# Patient Record
Sex: FEMALE | Race: WHITE | Employment: OTHER | URBAN - METROPOLITAN AREA
[De-identification: names, ages, dates, MRNs, and addresses within clinical notes are randomized per-mention and may not be internally consistent; named-entity substitution may affect disease eponyms.]

---

## 2019-06-20 ENCOUNTER — OFFICE VISIT (OUTPATIENT)
Dept: ENDOCRINOLOGY | Age: 79
End: 2019-06-20
Payer: MEDICARE

## 2019-06-20 VITALS
HEIGHT: 65 IN | BODY MASS INDEX: 27.59 KG/M2 | RESPIRATION RATE: 16 BRPM | WEIGHT: 165.6 LBS | SYSTOLIC BLOOD PRESSURE: 128 MMHG | DIASTOLIC BLOOD PRESSURE: 76 MMHG | OXYGEN SATURATION: 96 % | HEART RATE: 60 BPM

## 2019-06-20 DIAGNOSIS — E03.9 HYPOTHYROIDISM, UNSPECIFIED TYPE: Primary | ICD-10-CM

## 2019-06-20 DIAGNOSIS — M81.0 OSTEOPOROSIS, UNSPECIFIED OSTEOPOROSIS TYPE, UNSPECIFIED PATHOLOGICAL FRACTURE PRESENCE: ICD-10-CM

## 2019-06-20 DIAGNOSIS — R73.03 PREDIABETES: ICD-10-CM

## 2019-06-20 DIAGNOSIS — E55.9 VITAMIN D DEFICIENCY: ICD-10-CM

## 2019-06-20 PROCEDURE — 1036F TOBACCO NON-USER: CPT | Performed by: INTERNAL MEDICINE

## 2019-06-20 PROCEDURE — 1123F ACP DISCUSS/DSCN MKR DOCD: CPT | Performed by: INTERNAL MEDICINE

## 2019-06-20 PROCEDURE — G8400 PT W/DXA NO RESULTS DOC: HCPCS | Performed by: INTERNAL MEDICINE

## 2019-06-20 PROCEDURE — G8419 CALC BMI OUT NRM PARAM NOF/U: HCPCS | Performed by: INTERNAL MEDICINE

## 2019-06-20 PROCEDURE — G8427 DOCREV CUR MEDS BY ELIG CLIN: HCPCS | Performed by: INTERNAL MEDICINE

## 2019-06-20 PROCEDURE — 1090F PRES/ABSN URINE INCON ASSESS: CPT | Performed by: INTERNAL MEDICINE

## 2019-06-20 PROCEDURE — 99214 OFFICE O/P EST MOD 30 MIN: CPT | Performed by: INTERNAL MEDICINE

## 2019-06-20 PROCEDURE — 4040F PNEUMOC VAC/ADMIN/RCVD: CPT | Performed by: INTERNAL MEDICINE

## 2019-06-20 RX ORDER — GLUCOSAMINE HCL/CHONDROITIN SU 500-400 MG
CAPSULE ORAL
Qty: 250 STRIP | Refills: 5 | Status: SHIPPED | OUTPATIENT
Start: 2019-06-20

## 2019-06-20 RX ORDER — CHOLESTYRAMINE 4 G/9G
1 POWDER, FOR SUSPENSION ORAL 2 TIMES DAILY
COMMUNITY

## 2019-06-20 RX ORDER — LANCETS
EACH MISCELLANEOUS
Qty: 250 EACH | Refills: 5 | Status: SHIPPED | OUTPATIENT
Start: 2019-06-20

## 2019-06-20 RX ORDER — FUROSEMIDE 20 MG/1
20 TABLET ORAL
COMMUNITY

## 2019-06-20 RX ORDER — MULTIVIT-MIN/IRON/FOLIC ACID/K 18-600-40
CAPSULE ORAL DAILY
COMMUNITY

## 2019-06-20 RX ORDER — PRAVASTATIN SODIUM 20 MG
20 TABLET ORAL DAILY
COMMUNITY

## 2019-06-20 RX ORDER — POTASSIUM CHLORIDE 1.5 G/1.77G
20 POWDER, FOR SOLUTION ORAL
COMMUNITY

## 2019-06-20 RX ORDER — LEVOTHYROXINE SODIUM 75 MCG
75 TABLET ORAL DAILY
COMMUNITY
End: 2019-06-20 | Stop reason: SDUPTHER

## 2019-06-20 RX ORDER — LEVOTHYROXINE SODIUM 75 MCG
75 TABLET ORAL DAILY
Qty: 90 TABLET | Refills: 5 | Status: SHIPPED | OUTPATIENT
Start: 2019-06-20 | End: 2019-08-09 | Stop reason: SDUPTHER

## 2019-06-20 RX ORDER — WARFARIN SODIUM 5 MG/1
5 TABLET ORAL DAILY
COMMUNITY

## 2019-06-20 NOTE — PROGRESS NOTES
[address] [place] [address] Department of Endocrinology Diabetes and Metabolism   [address] [facility] [address]   Phone: [phone]  Fax: [phone]    Date of Service: 6/20/2019    Medical Records Reviewed:   I personally reviewed and summarized previous records     Primary Care Physician: Jerry Wong MD  Referring physician: Noam Rosales MD  Provider: Lesly Thibodeaux MD        Reason for the visit:  Osteoporosis, Primary Hypothyroidism, vitD deficiency, prediabetes     History of Present Illness: The history is provided by the patient. No  was used. Accuracy of the patient data is excellent. Ms Ronn Arizmendi is a very pleasant 77 y.o. female seen today for follow up visit of following issues     Hypothyroidism   was diagnosed with hypothyroidism many years ago and currently on name brand synthroid 75 mcg daily. Patient takes synthroid in the morning at empty stomach , wait one hour before eating , avoid multivitamins containing calcium or iron with it.  6/18/2019 - TSH 0.378, FT4 1.35     Denies any tremors palpitations heat or cold intolerance diarrhea or constipation change in weight or appetite    MNG  Thyroid US 11/2017:  Rt lobe measures 3.5 cm --> there 6 mm complex nodule   Lt lobe measures 3 cm --> 4 mm nodule mainly cystic  Isthmus measures 2 mm --> no nodules   Nodules smaller than 2014 and 2013     Sherrel Signs denies any new lumps, bumps in her neck, change in her voice, or shortness of breath. No family history of thyroid cancer. No prior history of radiation to head or neck region.     Osteoporosis   she was diagnosed with osteoporosis many years ago  was on fosamax in the past, used for about 5 years doesn't remember what yrs  Thinks Had one dose of reclast, didn't tolerate well  Was on drug holiday few years ago   Currently on Boniva 150 mg/month since 2016     DXA scan 11/2016 at [facility]  Femoral neck T score - 2.2, Total Hip T score - 1.5, abused: Not on file     Physically abused: Not on file     Forced sexual activity: Not on file   Other Topics Concern    Not on file   Social History Narrative    Not on file     FAMILY HISTORY   Family History   Problem Relation Age of Onset    Hypertension Father     Heart Disease Father     Colon Cancer Father     Prostate Cancer Father     Heart Disease Mother     Hypertension Mother     Atrial Fibrillation Brother      ALLERGIES AND DRUG REACTIONS   Allergies   Allergen Reactions    Diflucan [Fluconazole]     Sulfa Antibiotics        CURRENT MEDICATIONS   Current Outpatient Medications   Medication Sig Dispense Refill    lansoprazole (PREVACID SOLUTAB) 30 MG disintegrating tablet Take 30 mg by mouth as needed      warfarin (COUMADIN) 5 MG tablet Take 5 mg by mouth daily On tues take 7.5 mg      pravastatin (PRAVACHOL) 20 MG tablet Take 20 mg by mouth daily      Cholecalciferol (VITAMIN D) 2000 units CAPS capsule Take by mouth daily      cholestyramine (QUESTRAN) 4 g packet Take 1 packet by mouth 2 times daily 1/2 cap bid      Calcium Carb-Cholecalciferol (CALCIUM + VITAMIN D3 PO) Take 600 mg by mouth 2 times daily      furosemide (LASIX) 20 MG tablet Take 20 mg by mouth Twice a Week      potassium chloride (KLOR-CON) 20 MEQ packet Take 20 mEq by mouth Twice a Week      ONE TOUCH ULTRASOFT LANCETS MISC Test 4 times/day before meals and at bedtime and as needed for symptoms of irregular blood glucose. 250 each 5    blood glucose monitor strips One-Touch Ultra strips. Check 4 times/day before meals and at bedtime and as needed for symptoms of irregular blood glucose 250 strip 5    SYNTHROID 75 MCG tablet Take 1 tablet by mouth Daily 90 tablet 5     No current facility-administered medications for this visit. Review of Systems  Constitutional: No fever, no chills, no diaphoresis, no generalized weakness.   HEENT: No blurred vision, No sore throat, no ear pain, no hair loss  Neck: denied any neck swelling, difficulty swallowing,   Cadrdiopulomary: No CP, SOB or palpitation, No orthopnea or PND. No cough or wheezing. GI: No N/V/D, no constipation, No abdominal pain, no melena or hematochezia   : Denied any dysuria, hematuria, flank pain, discharge, or incontinence. Skin: denied any rash, ulcer, Hirsute, or hyperpigmentation. MSK: denied any joint deformity, joint pain/swelling, muscle pain, or back pain. Neuro: no numbess, no tingling, no weakness,     OBJECTIVE    /76 (Site: Left Upper Arm, Position: Sitting, Cuff Size: Medium Adult)   Pulse 60   Resp 16   Ht 5' 4.5\" (1.638 m)   Wt 165 lb 9.6 oz (75.1 kg)   SpO2 96%   BMI 27.99 kg/m²   BP Readings from Last 4 Encounters:   06/20/19 128/76     Wt Readings from Last 6 Encounters:   06/20/19 165 lb 9.6 oz (75.1 kg)       Physical examination:  General: awake alert, oriented x3, no abnormal position or movements. HEENT: normocephalic non traumatic  Neck: supple, no LN enlargement, no thyromegaly, no thyroid tenderness, no JVD. Pulm: Clear equal air entry no added sounds, no wheezing or rhonchi    CVS: S1 + S2, no murmur, no heave. Dorsalis pedis pulse palpable   Abd: soft lax, no tenderness, no organomegaly, audible bowel sounds. Skin: warm, no lesions, no rash.   Musculoskeletal: No back tenderness, no kyphosis/scoliosis   Neuro: CN intact, sensation normal , muscle power normal.  Psych: normal mood, and affect    Review of Laboratory Data:  I have reviewed the following:  BASIC METABOLIC PANELResulted: 9/69/6119 11:26 AM  Component Name Value   Glucose 89   Urea Nitrogen 17   Creatinine 0.79   BUN/CREATININE RATIO 22   Sodium(Na) 139   Potassium(K) 4.1   Chloride(Cl) 106   Carbon Dioxide(CO2) 29.0   ANION GAP 8.1   Calcium(Ca) 9.0   CALCULATED OSMOLALITY 289   EGFR NON  71   EGFR  83     6/18/2019: A1c 6.2, TSH 0.378, FT4 1.3, VitD 50.1     No results found for: WBC, RBC, HGB, HCT, MCV, MCH, MCHC, RDW, PLT, MPV, GRANULOCYTES, BANDS   No results found for: NA, K, CO2, BUN, CREATININE, CALCIUM, LABGLOM, GFRAA   No results found for: TSH, T4FREE, S8SZNEB, FT3, S9OFZEJ, TSI, TPOABS, THGAB  No results found for: LABA1C, GLUCOSE, MALBCR, LABMICR, LABCREA  No results found for: CHOL, TRIG, HDL  No results found for: VITD25    Medical Records/Labs/Images Review:   I personally reviewed and summarized previous records   All labs were reviewed independently    [address] Hyatt, a 77 y.o.-old female seen in for following issues     Primary hypothyroidism   · At goal, continue synthroid 75 mcg daily   · TFT in 6 months and before next visit in a year     MNG  · last thyroid US 11/2017, showed small stable thyroid nodule bilateral, smaller than prior US 2014  · small size and stability reassuring. Low risk of malignancy  · Next US will be in 2-3 years     Osteoporosis  · s/p fosamax x 5 Yrs, 1 dose reclast couldn't tolerate  · Was on Boniva 150 mg monthly from 2016 until 12/2018   · DXA scan 12/2018 --> osteopenia at hip and spine   · fall precautions  · cont ca/vit D    vitD deficiency   · on 1000 daily plus ome in Mv  · level 53.9 on 10/2018, cont same dose    H/o hyperparathyroidism   · Likely secondary to vitD deficiency   · Continue vitd supplements   · Recent labs showed normal calcium and normal renal function     Hyperlipidemia  · Continue statin     Prediabetes   · A1c 6.2%   · continue lifestyle changes    Return in about 1 year (around 6/20/2020) for Hypothyroidism, Osteoprosis . The above issues were reviewed with the patient who understood and agreed with the plan. Thank you for allowing us to participate in the care of this patient. Please do not hesitate to contact us with any additional questions. Diagnosis Orders   1. Hypothyroidism, unspecified type  SYNTHROID 75 MCG tablet    T4, Free    TSH without Reflex   2.  Prediabetes  ONE TOUCH ULTRASOFT LANCETS MISC    blood glucose monitor strips    Hemoglobin T7T    Basic Metabolic Panel   3.  Vitamin D deficiency  Vitamin D 25 Hydroxy       Jeffy Dave MD  Endocrinologist, [facility] Diabetes Care and Endocrinology   [address] [address]   Phone: [phone]  Fax: [phone]  ------------------------------------  Electronically signed by Deirdre Doyle MD  on 6/20/19 at 12:44 PM

## 2019-08-09 DIAGNOSIS — E03.9 HYPOTHYROIDISM, UNSPECIFIED TYPE: ICD-10-CM

## 2019-08-09 RX ORDER — LEVOTHYROXINE SODIUM 75 MCG
75 TABLET ORAL DAILY
Qty: 90 TABLET | Refills: 3 | Status: SHIPPED | OUTPATIENT
Start: 2019-08-09 | End: 2020-07-25 | Stop reason: SDUPTHER

## 2020-07-23 ENCOUNTER — TELEPHONE (OUTPATIENT)
Dept: ENDOCRINOLOGY | Age: 80
End: 2020-07-23

## 2020-07-25 RX ORDER — LEVOTHYROXINE SODIUM 75 MCG
75 TABLET ORAL DAILY
Qty: 30 TABLET | Refills: 1 | Status: SHIPPED
Start: 2020-07-25 | End: 2020-08-05 | Stop reason: SDUPTHER

## 2020-07-25 NOTE — TELEPHONE ENCOUNTER
I gave her a month of refill    Please add her to my schedule in a wk or two     Make it VV if this is more convenient for her

## 2020-08-05 RX ORDER — LEVOTHYROXINE SODIUM 75 MCG
75 TABLET ORAL DAILY
Qty: 90 TABLET | Refills: 0 | Status: SHIPPED
Start: 2020-08-05 | End: 2020-09-14 | Stop reason: SDUPTHER

## 2020-08-05 NOTE — TELEPHONE ENCOUNTER
Pt scheduled and appt w Dr Riya Olvera.     She needed synthroid to go to [facility] for 90 days

## 2020-09-14 ENCOUNTER — VIRTUAL VISIT (OUTPATIENT)
Dept: ENDOCRINOLOGY | Age: 80
End: 2020-09-14
Payer: MEDICARE

## 2020-09-14 PROCEDURE — 4040F PNEUMOC VAC/ADMIN/RCVD: CPT | Performed by: INTERNAL MEDICINE

## 2020-09-14 PROCEDURE — G8427 DOCREV CUR MEDS BY ELIG CLIN: HCPCS | Performed by: INTERNAL MEDICINE

## 2020-09-14 PROCEDURE — G8400 PT W/DXA NO RESULTS DOC: HCPCS | Performed by: INTERNAL MEDICINE

## 2020-09-14 PROCEDURE — 99214 OFFICE O/P EST MOD 30 MIN: CPT | Performed by: INTERNAL MEDICINE

## 2020-09-14 PROCEDURE — G8421 BMI NOT CALCULATED: HCPCS | Performed by: INTERNAL MEDICINE

## 2020-09-14 PROCEDURE — 1036F TOBACCO NON-USER: CPT | Performed by: INTERNAL MEDICINE

## 2020-09-14 PROCEDURE — 1090F PRES/ABSN URINE INCON ASSESS: CPT | Performed by: INTERNAL MEDICINE

## 2020-09-14 PROCEDURE — 1123F ACP DISCUSS/DSCN MKR DOCD: CPT | Performed by: INTERNAL MEDICINE

## 2020-09-14 RX ORDER — LEVOTHYROXINE SODIUM 75 MCG
TABLET ORAL
Qty: 90 TABLET | Refills: 3 | Status: SHIPPED
Start: 2020-09-14 | End: 2020-11-24 | Stop reason: SDUPTHER

## 2020-09-14 RX ORDER — GABAPENTIN 300 MG/1
600 CAPSULE ORAL 2 TIMES DAILY
COMMUNITY

## 2020-10-06 DIAGNOSIS — M81.0 AGE-RELATED OSTEOPOROSIS WITHOUT CURRENT PATHOLOGICAL FRACTURE: Primary | ICD-10-CM

## 2020-11-19 LAB
T4 FREE: 1.34
TSH SERPL DL<=0.05 MIU/L-ACNC: 1.07 UIU/ML

## 2020-11-24 RX ORDER — LEVOTHYROXINE SODIUM 75 MCG
TABLET ORAL
Qty: 90 TABLET | Refills: 3 | Status: SHIPPED
Start: 2020-11-24 | End: 2020-11-25

## 2020-11-25 ENCOUNTER — TELEPHONE (OUTPATIENT)
Dept: ENDOCRINOLOGY | Age: 80
End: 2020-11-25

## 2020-11-25 RX ORDER — LEVOTHYROXINE SODIUM 50 MCG
50 TABLET ORAL DAILY
Qty: 30 TABLET | Refills: 5 | Status: SHIPPED
Start: 2020-11-25 | End: 2021-02-19 | Stop reason: SDUPTHER

## 2020-11-25 NOTE — TELEPHONE ENCOUNTER
Sent scrip fr synthroid 50 mg daily to [facility] pharmacy in [place]     Pt with heart disease and I want to keep her thyroid level in low normal range

## 2021-02-09 DIAGNOSIS — R73.03 PREDIABETES: Primary | ICD-10-CM

## 2021-02-09 RX ORDER — BLOOD SUGAR DIAGNOSTIC
1 STRIP MISCELLANEOUS DAILY
Qty: 50 EACH | Refills: 11 | Status: SHIPPED | OUTPATIENT
Start: 2021-02-09

## 2021-02-09 RX ORDER — LANCETS 33 GAUGE
EACH MISCELLANEOUS
Qty: 100 EACH | Refills: 3 | Status: SHIPPED | OUTPATIENT
Start: 2021-02-09

## 2021-02-12 LAB
T4 FREE: NORMAL
TSH SERPL DL<=0.05 MIU/L-ACNC: NORMAL M[IU]/L

## 2021-02-15 DIAGNOSIS — E03.9 HYPOTHYROIDISM, UNSPECIFIED TYPE: ICD-10-CM

## 2021-02-18 ENCOUNTER — TELEPHONE (OUTPATIENT)
Dept: ENDOCRINOLOGY | Age: 81
End: 2021-02-18

## 2021-02-18 DIAGNOSIS — E03.9 HYPOTHYROIDISM, UNSPECIFIED TYPE: ICD-10-CM

## 2021-02-19 RX ORDER — LEVOTHYROXINE SODIUM 50 MCG
50 TABLET ORAL DAILY
Qty: 34 TABLET | Refills: 5 | Status: SHIPPED
Start: 2021-02-19 | End: 2021-04-15 | Stop reason: SDUPTHER

## 2021-02-19 NOTE — TELEPHONE ENCOUNTER
Please ask her to take 2 tablets on Sunday and see if she will feel better on this (take 1 tablet 6 days a week and 2 tabs on Sunday)
Pt advised  New rx sent
No

## 2021-03-22 ENCOUNTER — TELEPHONE (OUTPATIENT)
Dept: ENDOCRINOLOGY | Age: 81
End: 2021-03-22

## 2021-03-22 DIAGNOSIS — E03.9 HYPOTHYROIDISM, UNSPECIFIED TYPE: Primary | ICD-10-CM

## 2021-03-22 NOTE — TELEPHONE ENCOUNTER
Pt is currently taking 50 mcg  Pt takes 1 tablet 6 days a week and 2 tabs on Sunday)   She states she is still extremely tired and has seen a decent amount of hair loss. She only has a week left of her medication and didn't know if you wanted to make any changes.

## 2021-03-23 ENCOUNTER — TELEPHONE (OUTPATIENT)
Dept: ENDOCRINOLOGY | Age: 81
End: 2021-03-23

## 2021-03-24 LAB
T4 FREE: 1.13
T4 TOTAL: 11.1
TSH SERPL DL<=0.05 MIU/L-ACNC: 2.03 UIU/ML

## 2021-03-29 ENCOUNTER — TELEPHONE (OUTPATIENT)
Dept: ENDOCRINOLOGY | Age: 81
End: 2021-03-29

## 2021-03-29 DIAGNOSIS — E03.9 HYPOTHYROIDISM, UNSPECIFIED TYPE: Primary | ICD-10-CM

## 2021-03-29 DIAGNOSIS — E03.9 HYPOTHYROIDISM, UNSPECIFIED TYPE: ICD-10-CM

## 2021-03-30 NOTE — TELEPHONE ENCOUNTER
Your thyroid hormones level are in mid range of normal. I am concerning that alternating b/w 50 and 75 mcg/day will make your level high     I am ok if you want to try it alternating 50/75 mcg , but we need to repeat labs in 5-6 weeks to make sure that your level is not high

## 2021-04-15 DIAGNOSIS — E03.9 HYPOTHYROIDISM, UNSPECIFIED TYPE: ICD-10-CM

## 2021-04-15 RX ORDER — LEVOTHYROXINE SODIUM 50 MCG
50 TABLET ORAL DAILY
Qty: 90 TABLET | Refills: 3 | Status: SHIPPED
Start: 2021-04-15 | End: 2021-07-08 | Stop reason: SDUPTHER

## 2021-06-07 LAB
T4 FREE: 1.19
TSH SERPL DL<=0.05 MIU/L-ACNC: 1.29 UIU/ML

## 2021-06-09 DIAGNOSIS — E03.9 HYPOTHYROIDISM, UNSPECIFIED TYPE: ICD-10-CM

## 2021-06-13 ENCOUNTER — TELEPHONE (OUTPATIENT)
Dept: ENDOCRINOLOGY | Age: 81
End: 2021-06-13

## 2021-06-15 NOTE — TELEPHONE ENCOUNTER
Patient advised and verbalized understanding     She wanted to let you know she had an episode of atrial fibrillation on memorial day.

## 2021-06-29 DIAGNOSIS — M81.0 OSTEOPOROSIS, UNSPECIFIED OSTEOPOROSIS TYPE, UNSPECIFIED PATHOLOGICAL FRACTURE PRESENCE: ICD-10-CM

## 2021-06-29 DIAGNOSIS — E55.9 VITAMIN D DEFICIENCY: ICD-10-CM

## 2021-06-29 DIAGNOSIS — R73.03 PREDIABETES: ICD-10-CM

## 2021-06-29 DIAGNOSIS — E03.9 HYPOTHYROIDISM, UNSPECIFIED TYPE: Primary | ICD-10-CM

## 2021-07-08 DIAGNOSIS — E03.9 HYPOTHYROIDISM, UNSPECIFIED TYPE: ICD-10-CM

## 2021-07-08 RX ORDER — LEVOTHYROXINE SODIUM 0.07 MG/1
TABLET ORAL
Qty: 45 TABLET | Refills: 3 | Status: SHIPPED
Start: 2021-07-08 | End: 2021-07-19 | Stop reason: SDUPTHER

## 2021-07-08 RX ORDER — LEVOTHYROXINE SODIUM 50 MCG
TABLET ORAL
Qty: 45 TABLET | Refills: 3 | Status: SHIPPED
Start: 2021-07-08 | End: 2022-06-14 | Stop reason: SDUPTHER

## 2021-07-19 DIAGNOSIS — E03.9 HYPOTHYROIDISM, UNSPECIFIED TYPE: ICD-10-CM

## 2021-07-19 RX ORDER — LEVOTHYROXINE SODIUM 75 MCG
TABLET ORAL
Qty: 45 TABLET | Refills: 3 | Status: SHIPPED
Start: 2021-07-19 | End: 2022-06-14 | Stop reason: SDUPTHER

## 2021-07-23 LAB
TSH SERPL DL<=0.05 MIU/L-ACNC: 1.24 UIU/ML
TSH SERPL DL<=0.05 MIU/L-ACNC: 1.24 UIU/ML
VITAMIN D 25-HYDROXY: 39
VITAMIN D2, 25 HYDROXY: NORMAL
VITAMIN D3,25 HYDROXY: NORMAL

## 2021-07-26 DIAGNOSIS — E55.9 VITAMIN D DEFICIENCY: ICD-10-CM

## 2021-07-26 DIAGNOSIS — E03.9 HYPOTHYROIDISM, UNSPECIFIED TYPE: ICD-10-CM

## 2021-07-26 DIAGNOSIS — M81.0 OSTEOPOROSIS, UNSPECIFIED OSTEOPOROSIS TYPE, UNSPECIFIED PATHOLOGICAL FRACTURE PRESENCE: ICD-10-CM

## 2021-07-26 DIAGNOSIS — R73.03 PREDIABETES: ICD-10-CM

## 2021-07-26 LAB
AVERAGE GLUCOSE: NORMAL
BUN BLDV-MCNC: NORMAL MG/DL
CALCIUM SERPL-MCNC: 8.7 MG/DL
CHLORIDE BLD-SCNC: 109 MMOL/L
CO2: NORMAL
CREAT SERPL-MCNC: 0.73 MG/DL
GFR CALCULATED: 77
GLUCOSE BLD-MCNC: NORMAL MG/DL
HBA1C MFR BLD: 6 %
POTASSIUM SERPL-SCNC: 4 MMOL/L
SODIUM BLD-SCNC: 139 MMOL/L
T4 FREE: 1.08

## 2021-08-01 ENCOUNTER — TELEPHONE (OUTPATIENT)
Dept: ENDOCRINOLOGY | Age: 81
End: 2021-08-01

## 2021-08-02 NOTE — TELEPHONE ENCOUNTER
Notified patient that we need to schedule her a follow up appointment with Aris García. Patient said that she will think about it and get back to us.

## 2021-10-26 ENCOUNTER — TELEPHONE (OUTPATIENT)
Dept: ENDOCRINOLOGY | Age: 81
End: 2021-10-26

## 2021-10-27 ENCOUNTER — OFFICE VISIT (OUTPATIENT)
Dept: ENDOCRINOLOGY | Age: 81
End: 2021-10-27
Payer: MEDICARE

## 2021-10-27 VITALS
RESPIRATION RATE: 18 BRPM | WEIGHT: 179 LBS | BODY MASS INDEX: 29.82 KG/M2 | HEART RATE: 81 BPM | HEIGHT: 65 IN | SYSTOLIC BLOOD PRESSURE: 127 MMHG | TEMPERATURE: 97.2 F | DIASTOLIC BLOOD PRESSURE: 78 MMHG | OXYGEN SATURATION: 98 %

## 2021-10-27 DIAGNOSIS — L65.9 HAIR LOSS: ICD-10-CM

## 2021-10-27 DIAGNOSIS — E55.9 VITAMIN D DEFICIENCY: ICD-10-CM

## 2021-10-27 DIAGNOSIS — E03.9 HYPOTHYROIDISM, UNSPECIFIED TYPE: ICD-10-CM

## 2021-10-27 DIAGNOSIS — M81.0 OSTEOPOROSIS, UNSPECIFIED OSTEOPOROSIS TYPE, UNSPECIFIED PATHOLOGICAL FRACTURE PRESENCE: ICD-10-CM

## 2021-10-27 DIAGNOSIS — E03.9 HYPOTHYROIDISM, UNSPECIFIED TYPE: Primary | ICD-10-CM

## 2021-10-27 DIAGNOSIS — R73.03 PREDIABETES: ICD-10-CM

## 2021-10-27 PROCEDURE — G8484 FLU IMMUNIZE NO ADMIN: HCPCS | Performed by: INTERNAL MEDICINE

## 2021-10-27 PROCEDURE — 99214 OFFICE O/P EST MOD 30 MIN: CPT | Performed by: INTERNAL MEDICINE

## 2021-10-27 PROCEDURE — 4040F PNEUMOC VAC/ADMIN/RCVD: CPT | Performed by: INTERNAL MEDICINE

## 2021-10-27 PROCEDURE — G8399 PT W/DXA RESULTS DOCUMENT: HCPCS | Performed by: INTERNAL MEDICINE

## 2021-10-27 PROCEDURE — 1123F ACP DISCUSS/DSCN MKR DOCD: CPT | Performed by: INTERNAL MEDICINE

## 2021-10-27 PROCEDURE — G8417 CALC BMI ABV UP PARAM F/U: HCPCS | Performed by: INTERNAL MEDICINE

## 2021-10-27 PROCEDURE — 4004F PT TOBACCO SCREEN RCVD TLK: CPT | Performed by: INTERNAL MEDICINE

## 2021-10-27 PROCEDURE — G8427 DOCREV CUR MEDS BY ELIG CLIN: HCPCS | Performed by: INTERNAL MEDICINE

## 2021-10-27 PROCEDURE — 1090F PRES/ABSN URINE INCON ASSESS: CPT | Performed by: INTERNAL MEDICINE

## 2021-10-27 NOTE — PROGRESS NOTES
[address] [place] [address] Department of Endocrinology Diabetes and Metabolism   [address] [facility] [address]   Phone: [phone]  Fax: [phone]    Date of Service: 10/27/2021  Primary Care Physician: Caprice Ernandez MD  Provider: William Stone MD        Reason for the visit:  Osteoporosis, Primary Hypothyroidism, vitD deficiency, prediabetes     History of Present Illness: The history is provided by the patient. No  was used. Accuracy of the patient data is excellent. Ms Evy Medel is a very pleasant 79 y.o. female seen today for follow up visit of following issues     Hypothyroidism   was diagnosed with hypothyroidism many years ago and currently on name brand synthroid alternate 75/50 mcg daily. Patient takes synthroid in the morning at empty stomach , wait one hour before eating , avoid multivitamins containing calcium or iron with it. Lab Results   Component Value Date/Time    TSH 0.994 10/27/2021 12:00 PM    T4FREE 1.55 10/27/2021 12:00 PM     MNG  Thyroid US 11/2017:  Rt lobe measures 3.5 cm --> there 6 mm complex nodule   Lt lobe measures 3 cm --> 4 mm nodule mainly cystic  Isthmus measures 2 mm --> no nodules   Nodules smaller than 2014 and 2013     Eze Vasquez denies any new lumps, bumps in her neck, change in her voice, or shortness of breath. No family history of thyroid cancer. No prior history of radiation to head or neck region. Osteopenia   she was diagnosed with osteoporosis many years ago  was on fosamax and Boniva in the past  Wasn't able to tolerate Reclast in the past   Continue vitD supplement   No falls or fractures  On vitD daily    DEXA scan 6/2021 showed osteopenia (Total Hip T score - 1.2, Fem Neck T score - 2.2, Forearm T score - 2.4)     DXA scan 11/2016 at [facility]  Femoral neck T score - 2.2, Total Hip T score - 0.7, distal forearm T score - 1.9.  LS s/p fusion   LS wasn't done due to h/o spine fusion   Interval change can't be assessed due to dissimilar scan       PAST MEDICAL HISTORY   Past Medical History:   Diagnosis Date    A-fib [facility])     Arthritis     CAD (coronary artery disease)     GERD (gastroesophageal reflux disease)     HTN (hypertension)     Hyperparathyroidism ([address]     Hypothyroidism     Multinodular goiter     Osteoporosis     Vitamin D deficiency      PAST SURGICAL HISTORY   Past Surgical History:   Procedure Laterality Date    HEMORRHOIDECTOMY      HERNIA REPAIR      ROTATOR CUFF REPAIR       SOCIAL HISTORY   Tobacco:   reports that she has quit smoking. She has never used smokeless tobacco.  Alcol:   reports previous alcohol use. Illicit Drugs:   has no history on file for drug use. FAMILY HISTORY   Family History   Problem Relation Age of Onset    Hypertension Father     Heart Disease Father     Colon Cancer Father     Prostate Cancer Father     Heart Disease Mother     Hypertension Mother     Atrial Fibrillation Brother      ALLERGIES AND DRUG REACTIONS   Allergies   Allergen Reactions    Diflucan [Fluconazole]     Sulfa Antibiotics        CURRENT MEDICATIONS   Current Outpatient Medications   Medication Sig Dispense Refill    SYNTHROID 75 MCG tablet One tablet every other day alternating with 50mcg 45 tablet 3    SYNTHROID 50 MCG tablet One tablet every other day alternating with 75mcg 45 tablet 3    blood glucose test strips (ONETOUCH ULTRA) strip 1 each by In Vitro route daily As needed. 50 each 11    Lancets (ONETOUCH DELICA PLUS [id number]) MISC To test daily 100 each 3    gabapentin (NEURONTIN) 300 MG capsule Take 600 mg by mouth 2 times daily.       lansoprazole (PREVACID SOLUTAB) 30 MG disintegrating tablet Take 30 mg by mouth as needed      warfarin (COUMADIN) 5 MG tablet Take 5 mg by mouth daily On tues take 7.5 mg      pravastatin (PRAVACHOL) 20 MG tablet Take 20 mg by mouth daily      Cholecalciferol (VITAMIN D) 2000 units CAPS capsule Take by mouth daily      cholestyramine (QUESTRAN) 4 g packet Take 1 packet by mouth 2 times daily 1/2 cap bid      Calcium Carb-Cholecalciferol (CALCIUM + VITAMIN D3 PO) Take 600 mg by mouth 2 times daily      furosemide (LASIX) 20 MG tablet Take 20 mg by mouth Twice a Week      potassium chloride (KLOR-CON) 20 MEQ packet Take 20 mEq by mouth Twice a Week      ONE TOUCH ULTRASOFT LANCETS MISC Test 4 times/day before meals and at bedtime and as needed for symptoms of irregular blood glucose. 250 each 5    blood glucose monitor strips One-Touch Ultra strips. Check 4 times/day before meals and at bedtime and as needed for symptoms of irregular blood glucose 250 strip 5     No current facility-administered medications for this visit. Review of Systems  Constitutional: No fever, no chills, no diaphoresis, no generalized weakness. HEENT: No blurred vision, No sore throat, no ear pain, no hair loss  Neck: denied any neck swelling, difficulty swallowing,   Cadrdiopulomary: No CP, SOB or palpitation, No orthopnea or PND. No cough or wheezing. GI: No N/V/D, no constipation, No abdominal pain, no melena or hematochezia   : Denied any dysuria, hematuria, flank pain, discharge, or incontinence. Skin: denied any rash, ulcer, Hirsute, or hyperpigmentation. MSK: denied any joint deformity, joint pain/swelling, muscle pain, or back pain.   Neuro: no numbess, no tingling, no weakness,     OBJECTIVE    /78   Pulse 81   Temp 97.2 °F (36.2 °C) (Temporal)   Resp 18   Ht 5' 4.5\" (1.638 m)   Wt 179 lb (81.2 kg)   SpO2 98%   BMI 30.25 kg/m²   BP Readings from Last 4 Encounters:   10/27/21 127/78   06/20/19 128/76     Wt Readings from Last 6 Encounters:   10/27/21 179 lb (81.2 kg)   06/20/19 165 lb 9.6 oz (75.1 kg)     Physical examination:  General: awake alert, oriented x3  HEENT: normocephalic non traumatic, no exophthalmos   Neck: supple, thyroid tenderness,  Pulm: Clear equal air entry no added sounds  CVS: S1 + S2  Abd: soft lax, no tenderness  Skin: warm, no lesions, no rash. Neuro: CN intact,  muscle power normal  Psych: normal mood, and affect      Review of Laboratory Data:  I have reviewed the following:  BASIC METABOLIC PANEL (23/46/0666 8:52 AM EDT)  Component Value   Glucose 91   Urea Nitrogen 18   Creatinine 0.80   BUN/CREATININE RATIO 23   Sodium(Na) 138   Potassium(K) 4.2   Chloride(Cl) 105   Carbon Dioxide(CO2) 27.0   ANION GAP 10.2   Calcium(Ca) 9.1   CALCULATED OSMOLALITY 287   EGFR NON  70         No results found for: WBC, RBC, HGB, HCT, MCV, MCH, MCHC, RDW, PLT, MPV, GRANULOCYTES, BANDS   Lab Results   Component Value Date/Time     07/23/2021 12:00 AM    K 4.0 07/23/2021 12:00 AM    CREATININE 0.73 07/23/2021 12:00 AM    CALCIUM 8.7 07/23/2021 12:00 AM    LABGLOM 77 07/23/2021 12:00 AM      Lab Results   Component Value Date/Time    TSH 1.240 07/23/2021 12:00 AM    TSH 1.240 07/23/2021 12:00 AM    T4FREE 1.08 07/26/2021 12:00 AM    M6EYBOX 11.1 03/24/2021 12:00 AM     Lab Results   Component Value Date    LABA1C 6.0 07/23/2021     No results found for: CHOL, TRIG, HDL  Lab Results   Component Value Date    VITD25 39 07/23/2021     ASSESSMENT & RECOMMENDATIONS   Vianey Malave, a 79 y.o.-old female seen in for following issues     Primary hypothyroidism   · At goal, continue alternating 75/50 mcg   · TFT in 6 months     MNG  · last thyroid US 11/2017, showed small stable thyroid nodule bilateral, smaller than prior US 2014  · small size and stability reassuring.  Low risk of malignancy  · continue following with US     Osteoporosis  · s/p fosamax x 5 Yrs, 1 dose reclast couldn't tolerate  · Was on Boniva 150 mg monthly from 2016 until 12/2018   · DXA scan 12/2018 --> osteopenia at hip and spine   · DEXA scan 6/2021 showed osteopenia (Total Hip T score - 1.2, Fem Neck T score - 2.2, Forearm T score - 2.4)   · fall precautions  · cont ca/vit D    vitD deficiency   · on 2000 daily plus some in Mv    H/o hyperparathyroidism   · Likely secondary to vitD deficiency   · Continue vitd supplements   · Recent labs showed normal calcium and normal renal function    DM   · A1c 6.6%   · continue lifestyle changes    Return in about 1 year (around 10/27/2022) for hypothyroidism, osteoporosis, VitD deficiency. The above issues were reviewed with the patient who understood and agreed with the plan. Thank you for allowing us to participate in the care of this patient. Please do not hesitate to contact us with any additional questions. Diagnosis Orders   1. Hypothyroidism, unspecified type  TSH without Reflex    T4, Free    TSH without Reflex    T4, Free   2. Prediabetes     3. Osteoporosis, unspecified osteoporosis type, unspecified pathological fracture presence  Vitamin D 25 Hydroxy    Basic Metabolic Panel   4. Vitamin D deficiency  Vitamin D 25 Hydroxy    Basic Metabolic Panel   5. Hair loss  IRON AND TIBC    FERRITIN       Greg Reynolds MD  Endocrinologist, [facility] Diabetes Care and Endocrinology   [address] [place] [address]   Phone: [phone]  Fax: [phone]  ------------------------------------  An electronic signature was used to authenticate this note.  Huey Ortiz MD on 10/28/2021 at 7:55 PM

## 2021-10-28 LAB
ANION GAP SERPL CALCULATED.3IONS-SCNC: 14 MMOL/L (ref 7–16)
BUN BLDV-MCNC: 12 MG/DL (ref 6–23)
CALCIUM SERPL-MCNC: 9.3 MG/DL (ref 8.6–10.2)
CHLORIDE BLD-SCNC: 102 MMOL/L (ref 98–107)
CO2: 22 MMOL/L (ref 22–29)
CREAT SERPL-MCNC: 0.7 MG/DL (ref 0.5–1)
FERRITIN: 68 NG/ML
GFR AFRICAN AMERICAN: >60
GFR NON-AFRICAN AMERICAN: >60 ML/MIN/1.73
GLUCOSE BLD-MCNC: 101 MG/DL (ref 74–99)
IRON SATURATION: 33 % (ref 15–50)
IRON: 97 MCG/DL (ref 37–145)
POTASSIUM SERPL-SCNC: 4.4 MMOL/L (ref 3.5–5)
SODIUM BLD-SCNC: 138 MMOL/L (ref 132–146)
T4 FREE: 1.55 NG/DL (ref 0.93–1.7)
TOTAL IRON BINDING CAPACITY: 292 MCG/DL (ref 250–450)
TSH SERPL DL<=0.05 MIU/L-ACNC: 0.99 UIU/ML (ref 0.27–4.2)
VITAMIN D 25-HYDROXY: 49 NG/ML (ref 30–100)

## 2021-10-31 ENCOUNTER — TELEPHONE (OUTPATIENT)
Dept: ENDOCRINOLOGY | Age: 81
End: 2021-10-31

## 2021-10-31 NOTE — TELEPHONE ENCOUNTER
Notify pt,  I have reviewed your recent results      All results are very good including thyroid and iron study

## 2021-11-01 ENCOUNTER — TELEPHONE (OUTPATIENT)
Dept: ENDOCRINOLOGY | Age: 81
End: 2021-11-01

## 2021-11-01 DIAGNOSIS — R73.03 PREDIABETES: Primary | ICD-10-CM

## 2022-01-14 DIAGNOSIS — E11.65 POORLY CONTROLLED DIABETES MELLITUS (HCC): Primary | ICD-10-CM

## 2022-01-14 RX ORDER — FLASH GLUCOSE SCANNING READER
EACH MISCELLANEOUS
Qty: 1 EACH | Refills: 0 | Status: SHIPPED | OUTPATIENT
Start: 2022-01-14 | End: 2022-07-21 | Stop reason: CLARIF

## 2022-01-14 RX ORDER — FLASH GLUCOSE SENSOR
KIT MISCELLANEOUS
Qty: 3 EACH | Refills: 5 | Status: SHIPPED | OUTPATIENT
Start: 2022-01-14 | End: 2022-07-21 | Stop reason: CLARIF

## 2022-06-14 ENCOUNTER — TELEPHONE (OUTPATIENT)
Dept: ENDOCRINOLOGY | Age: 82
End: 2022-06-14

## 2022-06-14 DIAGNOSIS — E03.9 HYPOTHYROIDISM, UNSPECIFIED TYPE: ICD-10-CM

## 2022-06-14 DIAGNOSIS — E11.65 POORLY CONTROLLED DIABETES MELLITUS (HCC): Primary | ICD-10-CM

## 2022-06-14 DIAGNOSIS — E55.9 VITAMIN D DEFICIENCY: ICD-10-CM

## 2022-06-14 RX ORDER — LEVOTHYROXINE SODIUM 75 MCG
TABLET ORAL
Qty: 45 TABLET | Refills: 3 | Status: SHIPPED
Start: 2022-06-14 | End: 2022-09-21 | Stop reason: SDUPTHER

## 2022-06-14 RX ORDER — LEVOTHYROXINE SODIUM 50 MCG
TABLET ORAL
Qty: 45 TABLET | Refills: 3 | Status: SHIPPED
Start: 2022-06-14 | End: 2022-09-21 | Stop reason: SDUPTHER

## 2022-06-14 NOTE — TELEPHONE ENCOUNTER
The patient has an appointment in October. She just has orders for her TFTs. She wants to see if you can order her VitD, CMP and HbA1c as well. Please advise.

## 2022-06-20 ENCOUNTER — TELEPHONE (OUTPATIENT)
Dept: ENDOCRINOLOGY | Age: 82
End: 2022-06-20

## 2022-06-20 NOTE — TELEPHONE ENCOUNTER
Forrest Black called and asked for refill on her Sythroid on 6/14/22. I sent both doses to [address] [place] [address] as requested. She called back today requesting that I send in the other dose because she only received the 50 mcg dose. I call the pharmacy and clarified with them that she needs both doses I sent over. The pharmacist looked at what I sent and apologized they will have it ready for her shortly. I called and spoke with Forrest Black to let her know they will have it ready for her soon.

## 2022-09-20 DIAGNOSIS — E03.9 HYPOTHYROIDISM, UNSPECIFIED TYPE: ICD-10-CM

## 2022-09-21 DIAGNOSIS — E03.9 HYPOTHYROIDISM, UNSPECIFIED TYPE: ICD-10-CM

## 2022-09-21 RX ORDER — LEVOTHYROXINE SODIUM 75 MCG
TABLET ORAL
Qty: 45 TABLET | Refills: 6 | Status: SHIPPED | OUTPATIENT
Start: 2022-09-21

## 2022-09-21 RX ORDER — LEVOTHYROXINE SODIUM 50 MCG
TABLET ORAL
Qty: 45 TABLET | Refills: 6 | Status: SHIPPED | OUTPATIENT
Start: 2022-09-21

## 2022-09-21 RX ORDER — LEVOTHYROXINE SODIUM 75 MCG
TABLET ORAL
Qty: 45 TABLET | Refills: 3 | OUTPATIENT
Start: 2022-09-21

## 2022-10-28 NOTE — PROGRESS NOTES
Kristian Gaona was read the following message We want to confirm that, for purposes of billing, this is a virtual visit with your provider for which we will submit a claim for reimbursement with your insurance company. You will be responsible for any copays, coinsurance amounts or other amounts not covered by your insurance company. If you do not accept this, unfortunately we will not be able to schedule a virtual visit with the provider. Do you accept?  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  Pt states she was directed to ER if \"blister\" developed.  Nausea reported, denies known fever.

## 2022-11-10 DIAGNOSIS — R73.03 PREDIABETES: ICD-10-CM

## 2022-11-11 RX ORDER — BLOOD SUGAR DIAGNOSTIC
1 STRIP MISCELLANEOUS DAILY
Qty: 100 EACH | Refills: 3 | Status: SHIPPED
Start: 2022-11-11 | End: 2022-11-28

## 2022-11-11 RX ORDER — LANCETS 33 GAUGE
EACH MISCELLANEOUS
Qty: 100 EACH | Refills: 3 | Status: SHIPPED | OUTPATIENT
Start: 2022-11-11

## 2022-11-11 RX ORDER — BLOOD-GLUCOSE METER
EACH MISCELLANEOUS
Qty: 1 KIT | Refills: 0 | Status: SHIPPED
Start: 2022-11-11 | End: 2022-11-17 | Stop reason: SDUPTHER

## 2022-11-16 DIAGNOSIS — R73.9 HYPERGLYCEMIA: ICD-10-CM

## 2022-11-16 DIAGNOSIS — R73.03 PREDIABETES: ICD-10-CM

## 2022-11-16 DIAGNOSIS — E11.65 POORLY CONTROLLED DIABETES MELLITUS (HCC): ICD-10-CM

## 2022-11-17 RX ORDER — BLOOD-GLUCOSE METER
EACH MISCELLANEOUS
Qty: 1 KIT | Refills: 0 | Status: SHIPPED | OUTPATIENT
Start: 2022-11-17

## 2022-11-28 ENCOUNTER — TELEPHONE (OUTPATIENT)
Dept: ENDOCRINOLOGY | Age: 82
End: 2022-11-28

## 2022-11-28 DIAGNOSIS — E11.65 POORLY CONTROLLED DIABETES MELLITUS (HCC): Primary | ICD-10-CM

## 2022-11-28 NOTE — TELEPHONE ENCOUNTER
Luís Sender called stating he insurance will not cover the [employer]. I sent in for strips for her old meter.

## 2023-06-07 DIAGNOSIS — E03.9 HYPOTHYROIDISM, UNSPECIFIED TYPE: ICD-10-CM

## 2023-06-08 RX ORDER — LEVOTHYROXINE SODIUM 50 MCG
TABLET ORAL
Qty: 45 TABLET | Refills: 6 | Status: SHIPPED | OUTPATIENT
Start: 2023-06-08

## 2023-06-08 RX ORDER — LEVOTHYROXINE SODIUM 75 MCG
TABLET ORAL
Qty: 45 TABLET | Refills: 6 | Status: SHIPPED | OUTPATIENT
Start: 2023-06-08

## 2023-06-09 DIAGNOSIS — E03.9 HYPOTHYROIDISM, UNSPECIFIED TYPE: ICD-10-CM

## 2023-06-09 RX ORDER — LEVOTHYROXINE SODIUM 75 MCG
TABLET ORAL
Qty: 45 TABLET | Refills: 6 | Status: CANCELLED | OUTPATIENT
Start: 2023-06-09

## 2023-06-09 RX ORDER — LEVOTHYROXINE SODIUM 50 MCG
TABLET ORAL
Qty: 45 TABLET | Refills: 6 | Status: CANCELLED | OUTPATIENT
Start: 2023-06-09

## 2023-08-17 ENCOUNTER — OFFICE VISIT (OUTPATIENT)
Dept: ENDOCRINOLOGY | Age: 83
End: 2023-08-17

## 2023-08-17 VITALS
OXYGEN SATURATION: 96 % | HEIGHT: 64 IN | DIASTOLIC BLOOD PRESSURE: 59 MMHG | SYSTOLIC BLOOD PRESSURE: 108 MMHG | WEIGHT: 174 LBS | HEART RATE: 100 BPM | BODY MASS INDEX: 29.71 KG/M2

## 2023-08-17 DIAGNOSIS — E03.9 HYPOTHYROIDISM, UNSPECIFIED TYPE: Primary | ICD-10-CM

## 2023-08-17 DIAGNOSIS — R73.9 HYPERGLYCEMIA: ICD-10-CM

## 2023-08-17 DIAGNOSIS — M81.0 OSTEOPOROSIS, UNSPECIFIED OSTEOPOROSIS TYPE, UNSPECIFIED PATHOLOGICAL FRACTURE PRESENCE: ICD-10-CM

## 2023-08-17 DIAGNOSIS — E11.9 TYPE 2 DIABETES MELLITUS WITHOUT COMPLICATION, WITHOUT LONG-TERM CURRENT USE OF INSULIN (HCC): ICD-10-CM

## 2023-08-17 RX ORDER — LEVOTHYROXINE SODIUM 50 MCG
TABLET ORAL
Qty: 45 TABLET | Refills: 3 | Status: SHIPPED | OUTPATIENT
Start: 2023-08-17

## 2023-08-17 RX ORDER — LEVOTHYROXINE SODIUM 75 MCG
TABLET ORAL
Qty: 45 TABLET | Refills: 3 | Status: SHIPPED | OUTPATIENT
Start: 2023-08-17

## 2023-08-17 NOTE — PROGRESS NOTES
[address] [facility] Department of Endocrinology Diabetes and Metabolism   [address] [facility] [address]   Phone: [phone]  Fax: [phone]    Date of Service: 8/17/2023  Primary Care Physician: Archana Bangura MD  Provider: Camellia Bosworth MD        Reason for the visit:  Osteoporosis, Primary Hypothyroidism, vitD deficiency, prediabetes     History of Present Illness: The history is provided by the patient. No  was used. Accuracy of the patient data is excellent. Ms Camron Worthington is a very pleasant 79 y.o. female seen today for follow up visit of following issues     Hypothyroidism   was diagnosed with hypothyroidism many years ago and currently on name brand synthroid alternate 75/50 mcg daily. Patient takes synthroid in the morning at empty stomach , wait one hour before eating , avoid multivitamins containing calcium or iron with it. MNG  Thyroid US 11/2017:  Rt lobe measures 3.5 cm --> there 6 mm complex nodule   Lt lobe measures 3 cm --> 4 mm nodule mainly cystic  Isthmus measures 2 mm --> no nodules   Nodules smaller than 2014 and 2013     Juventino Curtis denies any new lumps, bumps in her neck, change in her voice, or shortness of breath. No family history of thyroid cancer. No prior history of radiation to head or neck region. Osteopenia   she was diagnosed with osteoporosis many years ago  was on fosamax and Boniva in the past  Wasn't able to tolerate Reclast in the past   Continue vitD supplement   No falls or fractures  On vitD daily    DEXA scan 6/2021 showed osteopenia (Total Hip T score - 1.2, Fem Neck T score - 2.2, Forearm T score - 2.4)     DXA scan 11/2016 at [facility]  Femoral neck T score - 2.2, Total Hip T score - 0.7, distal forearm T score - 1.9.  LS s/p fusion   LS wasn't done due to h/o spine fusion   Interval change can't be assessed due to dissimilar scan       PAST MEDICAL HISTORY   Past Medical History:   Diagnosis Date    A-fib

## 2023-08-31 LAB
AVERAGE GLUCOSE: NORMAL
HBA1C MFR BLD: 5.5 %
VITAMIN D 25-HYDROXY: 97
VITAMIN D2, 25 HYDROXY: NORMAL
VITAMIN D3,25 HYDROXY: NORMAL

## 2023-10-05 DIAGNOSIS — M81.0 OSTEOPOROSIS, UNSPECIFIED OSTEOPOROSIS TYPE, UNSPECIFIED PATHOLOGICAL FRACTURE PRESENCE: ICD-10-CM

## 2023-10-06 ENCOUNTER — TELEPHONE (OUTPATIENT)
Dept: ENDOCRINOLOGY | Age: 83
End: 2023-10-06

## 2023-10-06 NOTE — TELEPHONE ENCOUNTER
Patient called had question regarding her A/C she didn't go into detail what she needed, left message for her to call us back

## 2023-10-09 ENCOUNTER — TELEPHONE (OUTPATIENT)
Dept: ENDOCRINOLOGY | Age: 83
End: 2023-10-09

## 2023-10-09 DIAGNOSIS — M81.0 OSTEOPOROSIS, UNSPECIFIED OSTEOPOROSIS TYPE, UNSPECIFIED PATHOLOGICAL FRACTURE PRESENCE: Primary | ICD-10-CM

## 2023-10-09 NOTE — TELEPHONE ENCOUNTER
Her DEXA scan showed osteoporosis.     Please check if she ever used Fosamax or any medication for osteoporosis in the past

## 2023-10-12 NOTE — TELEPHONE ENCOUNTER
Given the fact that she was on bisphosphonate ni the past and her bone density still low, I recommend Prolia if insurance covers

## 2023-10-13 ENCOUNTER — TELEPHONE (OUTPATIENT)
Dept: ENDOCRINOLOGY | Age: 83
End: 2023-10-13

## 2023-10-13 DIAGNOSIS — M81.0 OSTEOPOROSIS, UNSPECIFIED OSTEOPOROSIS TYPE, UNSPECIFIED PATHOLOGICAL FRACTURE PRESENCE: ICD-10-CM

## 2023-10-13 NOTE — TELEPHONE ENCOUNTER
Patient called in yelling at us she stated she dont want the Prolia injection she wants Boniva only,  she wants to talk to you today.      Please advise   Can you please call her   Thank you

## 2023-10-18 NOTE — TELEPHONE ENCOUNTER
Patient called in very upset  at us she stated she dont want the Prolia injection she wants Boniva only,  she wants to talk to you today.       Please advise   Can you please call her   Thank you

## 2023-10-24 RX ORDER — IBANDRONATE SODIUM 150 MG/1
150 TABLET, FILM COATED ORAL
Qty: 3 TABLET | Refills: 3 | Status: SHIPPED | OUTPATIENT
Start: 2023-10-24

## 2023-11-03 DIAGNOSIS — E03.9 HYPOTHYROIDISM, UNSPECIFIED TYPE: ICD-10-CM

## 2023-11-03 DIAGNOSIS — M81.0 OSTEOPOROSIS, UNSPECIFIED OSTEOPOROSIS TYPE, UNSPECIFIED PATHOLOGICAL FRACTURE PRESENCE: ICD-10-CM

## 2023-11-03 DIAGNOSIS — E11.9 TYPE 2 DIABETES MELLITUS WITHOUT COMPLICATION, WITHOUT LONG-TERM CURRENT USE OF INSULIN (HCC): ICD-10-CM

## 2024-04-22 DIAGNOSIS — R73.03 PREDIABETES: Primary | ICD-10-CM

## 2024-04-22 RX ORDER — LANCETS 33 GAUGE
EACH MISCELLANEOUS
Qty: 100 EACH | Refills: 3 | Status: SHIPPED | OUTPATIENT
Start: 2024-04-22

## 2024-05-07 DIAGNOSIS — R73.03 PREDIABETES: ICD-10-CM

## 2024-05-07 DIAGNOSIS — E11.65 POORLY CONTROLLED DIABETES MELLITUS (HCC): Primary | ICD-10-CM

## 2024-05-07 DIAGNOSIS — R73.9 HYPERGLYCEMIA: ICD-10-CM

## 2024-05-07 RX ORDER — LANCETS 33 GAUGE
EACH MISCELLANEOUS
Qty: 100 EACH | Refills: 3 | Status: SHIPPED
Start: 2024-05-07 | End: 2024-05-08 | Stop reason: SDUPTHER

## 2024-05-08 DIAGNOSIS — E11.65 POORLY CONTROLLED DIABETES MELLITUS (HCC): ICD-10-CM

## 2024-05-08 DIAGNOSIS — R73.9 HYPERGLYCEMIA: ICD-10-CM

## 2024-05-08 DIAGNOSIS — E11.9 DIABETES MELLITUS WITH NO COMPLICATION (HCC): Primary | ICD-10-CM

## 2024-05-08 DIAGNOSIS — R73.03 PREDIABETES: ICD-10-CM

## 2024-05-08 RX ORDER — LANCETS 33 GAUGE
EACH MISCELLANEOUS
Qty: 100 EACH | Refills: 3 | Status: SHIPPED | OUTPATIENT
Start: 2024-05-08

## 2024-05-15 DIAGNOSIS — E11.49 TYPE II DIABETES MELLITUS WITH NEUROLOGICAL MANIFESTATIONS (HCC): Primary | ICD-10-CM

## 2024-05-15 DIAGNOSIS — R73.9 HYPERGLYCEMIA: ICD-10-CM

## 2024-05-15 DIAGNOSIS — R73.03 PREDIABETES: ICD-10-CM

## 2024-05-15 DIAGNOSIS — E11.65 POORLY CONTROLLED DIABETES MELLITUS (HCC): ICD-10-CM

## 2024-06-06 ENCOUNTER — TELEPHONE (OUTPATIENT)
Dept: ENDOCRINOLOGY | Age: 84
End: 2024-06-06

## 2024-06-06 RX ORDER — IBANDRONATE SODIUM 150 MG/1
150 TABLET, FILM COATED ORAL
Qty: 3 TABLET | Refills: 3 | Status: SHIPPED | OUTPATIENT
Start: 2024-06-06

## 2024-06-06 NOTE — TELEPHONE ENCOUNTER
----- Message from Jade Krause sent at 6/5/2024  8:12 PM EDT -----  Regarding: Boniva prescription  Contact: [phone]  I called the office week and asked if Dr Mohamud would reorder Boniva for me.  After my bone density I waited a while to have something ordered and when I didn't receive a message from your office my PCP ordered Fosamax  After 5 months I have increased muscle and joint pain  I had problems with Fosamax in the past.  I never heard back about my thyroid studies either  I use [facility] in [place] for scripts

## 2024-08-26 DIAGNOSIS — E03.9 HYPOTHYROIDISM, UNSPECIFIED TYPE: ICD-10-CM

## 2024-08-26 RX ORDER — LEVOTHYROXINE SODIUM 50 MCG
TABLET ORAL
Qty: 45 TABLET | Refills: 0 | Status: SHIPPED | OUTPATIENT
Start: 2024-08-26

## 2024-08-26 RX ORDER — LEVOTHYROXINE SODIUM 75 MCG
TABLET ORAL
Qty: 45 TABLET | Refills: 0 | Status: SHIPPED | OUTPATIENT
Start: 2024-08-26

## 2025-01-07 DIAGNOSIS — E03.9 HYPOTHYROIDISM, UNSPECIFIED TYPE: ICD-10-CM

## 2025-01-08 RX ORDER — LEVOTHYROXINE SODIUM 50 MCG
TABLET ORAL
Qty: 45 TABLET | Refills: 0 | Status: SHIPPED | OUTPATIENT
Start: 2025-01-08

## 2025-01-08 RX ORDER — LEVOTHYROXINE SODIUM 75 MCG
TABLET ORAL
Qty: 45 TABLET | Refills: 0 | Status: SHIPPED | OUTPATIENT
Start: 2025-01-08

## 2025-02-04 ENCOUNTER — OFFICE VISIT (OUTPATIENT)
Dept: ENDOCRINOLOGY | Age: 85
End: 2025-02-04
Payer: MEDICARE

## 2025-02-04 VITALS
HEIGHT: 64 IN | HEART RATE: 60 BPM | DIASTOLIC BLOOD PRESSURE: 83 MMHG | OXYGEN SATURATION: 96 % | BODY MASS INDEX: 33.46 KG/M2 | WEIGHT: 196 LBS | SYSTOLIC BLOOD PRESSURE: 136 MMHG

## 2025-02-04 DIAGNOSIS — E21.3 HYPERPARATHYROIDISM (HCC): ICD-10-CM

## 2025-02-04 DIAGNOSIS — E04.2 MULTIPLE THYROID NODULES: ICD-10-CM

## 2025-02-04 DIAGNOSIS — E03.9 HYPOTHYROIDISM, UNSPECIFIED TYPE: Primary | ICD-10-CM

## 2025-02-04 DIAGNOSIS — E11.9 DIABETES MELLITUS WITH NO COMPLICATION (HCC): ICD-10-CM

## 2025-02-04 DIAGNOSIS — E55.9 VITAMIN D DEFICIENCY: ICD-10-CM

## 2025-02-04 DIAGNOSIS — M81.0 OSTEOPOROSIS, UNSPECIFIED OSTEOPOROSIS TYPE, UNSPECIFIED PATHOLOGICAL FRACTURE PRESENCE: ICD-10-CM

## 2025-02-04 PROCEDURE — G8399 PT W/DXA RESULTS DOCUMENT: HCPCS | Performed by: CLINICAL NURSE SPECIALIST

## 2025-02-04 PROCEDURE — 4004F PT TOBACCO SCREEN RCVD TLK: CPT | Performed by: CLINICAL NURSE SPECIALIST

## 2025-02-04 PROCEDURE — G2211 COMPLEX E/M VISIT ADD ON: HCPCS | Performed by: CLINICAL NURSE SPECIALIST

## 2025-02-04 PROCEDURE — 1123F ACP DISCUSS/DSCN MKR DOCD: CPT | Performed by: CLINICAL NURSE SPECIALIST

## 2025-02-04 PROCEDURE — 1090F PRES/ABSN URINE INCON ASSESS: CPT | Performed by: CLINICAL NURSE SPECIALIST

## 2025-02-04 PROCEDURE — 1159F MED LIST DOCD IN RCRD: CPT | Performed by: CLINICAL NURSE SPECIALIST

## 2025-02-04 PROCEDURE — G8427 DOCREV CUR MEDS BY ELIG CLIN: HCPCS | Performed by: CLINICAL NURSE SPECIALIST

## 2025-02-04 PROCEDURE — G8417 CALC BMI ABV UP PARAM F/U: HCPCS | Performed by: CLINICAL NURSE SPECIALIST

## 2025-02-04 PROCEDURE — 99214 OFFICE O/P EST MOD 30 MIN: CPT | Performed by: CLINICAL NURSE SPECIALIST

## 2025-02-04 RX ORDER — LEVOTHYROXINE SODIUM 50 MCG
TABLET ORAL
Qty: 45 TABLET | Refills: 1 | Status: SHIPPED | OUTPATIENT
Start: 2025-02-04

## 2025-02-04 RX ORDER — LEVOTHYROXINE SODIUM 75 MCG
TABLET ORAL
Qty: 45 TABLET | Refills: 1 | Status: SHIPPED | OUTPATIENT
Start: 2025-02-04

## 2025-02-04 RX ORDER — IBANDRONATE SODIUM 150 MG/1
150 TABLET, FILM COATED ORAL
Qty: 3 TABLET | Refills: 3 | Status: SHIPPED | OUTPATIENT
Start: 2025-02-04

## 2025-02-04 NOTE — PROGRESS NOTES
[employer]  [facility] Department of Endocrinology Diabetes and Metabolism   [address] [facility], [place], [place], [address]   Fax: [phone]    Date of Service: 2/4/2025  Primary Care Physician: Jonathan Pacheco MD  Provider:FADI Bhandari - CNS         Reason for the visit:  Osteoporosis, Primary Hypothyroidism, vitD deficiency, prediabetes     History of Present Illness:  The history is provided by the patient. No  was used. Accuracy of the patient data is excellent.    Ms Jade Krause is a very pleasant 84 y.o. female seen today for follow up visit of following issues     Hypothyroidism   was diagnosed with hypothyroidism many years ago and currently on name brand synthroid alternate 75/50 mcg daily. Patient takes synthroid in the morning at empty stomach , wait one hour before eating , avoid multivitamins containing calcium or iron with it.       [facility]  Thyroid US 11/2017:  Rt lobe measures 3.5 cm --> there 6 mm complex nodule   Lt lobe measures 3 cm --> 4 mm nodule mainly cystic  Isthmus measures 2 mm --> no nodules   Nodules smaller than 2014 and 2013     Jade Krause denies any new lumps, bumps in her neck, change in her voice, or shortness of breath. No family history of thyroid cancer. No prior history of radiation to head or neck region.  Osteopenia   she was diagnosed with osteoporosis many years ago  was on fosamax and Boniva in the past  Wasn't able to tolerate Reclast in the past   Continue vitD supplement   No falls or fractures  On vitD daily    DEXA scan 6/2021 showed osteopenia (Total Hip T score - 1.2, Fem Neck T score - 2.2, Forearm T score - 2.4)     DXA scan 11/2016 at [facility]  Femoral neck T score - 2.2, Total Hip T score - 0.7, distal forearm T score - 1.9. LS s/p fusion   LS wasn't done due to h/o spine fusion   Interval change can't be assessed due to dissimilar scan       PAST MEDICAL HISTORY   Past Medical History:   Diagnosis Date    A-fib

## 2025-02-06 DIAGNOSIS — E11.65 POORLY CONTROLLED DIABETES MELLITUS (HCC): ICD-10-CM

## 2025-02-09 DIAGNOSIS — E03.9 HYPOTHYROIDISM, UNSPECIFIED TYPE: ICD-10-CM

## 2025-02-09 DIAGNOSIS — E11.65 POORLY CONTROLLED DIABETES MELLITUS (HCC): ICD-10-CM

## 2025-02-11 RX ORDER — LEVOTHYROXINE SODIUM 75 MCG
TABLET ORAL
Qty: 45 TABLET | Refills: 1 | OUTPATIENT
Start: 2025-02-11

## 2025-02-28 DIAGNOSIS — E11.49 TYPE II DIABETES MELLITUS WITH NEUROLOGICAL MANIFESTATIONS (HCC): ICD-10-CM

## 2025-04-18 DIAGNOSIS — E03.9 HYPOTHYROIDISM, UNSPECIFIED TYPE: ICD-10-CM

## 2025-04-18 DIAGNOSIS — E11.49 TYPE II DIABETES MELLITUS WITH NEUROLOGICAL MANIFESTATIONS (HCC): ICD-10-CM

## 2025-04-18 RX ORDER — LEVOTHYROXINE SODIUM 75 MCG
TABLET ORAL
Qty: 45 TABLET | Refills: 1 | Status: CANCELLED | OUTPATIENT
Start: 2025-04-18

## 2025-04-21 RX ORDER — LEVOTHYROXINE SODIUM 75 MCG
TABLET ORAL
Qty: 45 TABLET | Refills: 3 | Status: SHIPPED | OUTPATIENT
Start: 2025-04-21

## 2025-04-27 NOTE — LETTER
[address] [place] [address] Department of Endocrinology Diabetes and Metabolism   [address] [facility] [address]   Phone: [phone]  Fax: [phone]      Provider: Bobby Madden MD  Primary Care Physician: Vicente Jha MD   Referring Provider: Dayan Ortiz MD    Patient: Sowmya Partida  YOB: 1940  Date of Visit: 6/20/2019      Dear Dr. Vicente Jha MD   I had the pleasure of seeing your patient Sowmya Partida today at endocrine clinic for follow up visit and I enclosed a copy of the office visit completed today. Thank you very much for asking us to participate in the care of this very pleasant patient. Please don't hesitate to call if there are any further questions or concerns. Sincerely   Bobby Madden MD  Endocrinologist, [address] [address]   Phone: [phone]  Fax: [phone]      [address] [place] [address] Department of Endocrinology Diabetes and Metabolism   [address] [place] [address]   Phone: [phone]  Fax: [phone]    Date of Service: 6/20/2019    Medical Records Reviewed:   I personally reviewed and summarized previous records     Primary Care Physician: Vicente Jha MD  Referring physician: Dayan Ortiz MD  Provider: Bobby Madden MD        Reason for the visit:  Osteoporosis, Primary Hypothyroidism, vitD deficiency, prediabetes     History of Present Illness: The history is provided by the patient. No  was used. Accuracy of the patient data is excellent. Ms Luís Jolley is a very pleasant 77 y.o. female seen today for follow up visit of following issues     Hypothyroidism   was diagnosed with hypothyroidism many years ago and currently on name brand synthroid 75 mcg daily.  Patient takes synthroid in the morning at empty stomach , wait one hour before eating , avoid multivitamins containing calcium or iron with it.  6/18/2019 - TSH 0.378, FT4 1.35 Presents with 10 days of constipation, has been battling for months and was found to have a perirectal mass in January of this year.   Chronic constipation at this point, complicated by rectal mass  Aggressive bowel regimen, monitor stool output   GI consulted   Worry: Not on file     Inability: Not on file    Transportation needs:     Medical: Not on file     Non-medical: Not on file   Tobacco Use    Smoking status: Former Smoker    Smokeless tobacco: Never Used    Tobacco comment: quit > 10 years ago    Substance and Sexual Activity    Alcohol use: Not Currently    Drug use: Not on file    Sexual activity: Never   Lifestyle    Physical activity:     Days per week: Not on file     Minutes per session: Not on file    Stress: Not on file   Relationships    Social connections:     Talks on phone: Not on file     Gets together: Not on file     Attends Confucianism service: Not on file     Active member of club or organization: Not on file     Attends meetings of clubs or organizations: Not on file     Relationship status: Not on file    Intimate partner violence:     Fear of current or ex partner: Not on file     Emotionally abused: Not on file     Physically abused: Not on file     Forced sexual activity: Not on file   Other Topics Concern    Not on file   Social History Narrative    Not on file     FAMILY HISTORY   Family History   Problem Relation Age of Onset    Hypertension Father     Heart Disease Father     Colon Cancer Father     Prostate Cancer Father     Heart Disease Mother     Hypertension Mother     Atrial Fibrillation Brother      ALLERGIES AND DRUG REACTIONS   Allergies   Allergen Reactions    Diflucan [Fluconazole]     Sulfa Antibiotics        CURRENT MEDICATIONS   Current Outpatient Medications   Medication Sig Dispense Refill    lansoprazole (PREVACID SOLUTAB) 30 MG disintegrating tablet Take 30 mg by mouth as needed      warfarin (COUMADIN) 5 MG tablet Take 5 mg by mouth daily On tues take 7.5 mg      pravastatin (PRAVACHOL) 20 MG tablet Take 20 mg by mouth daily      Cholecalciferol (VITAMIN D) 2000 units CAPS capsule Take by mouth daily      cholestyramine (QUESTRAN) 4 g packet Take 1 packet by mouth 2 times daily 1/2 cap bid  Calcium Carb-Cholecalciferol (CALCIUM + VITAMIN D3 PO) Take 600 mg by mouth 2 times daily      furosemide (LASIX) 20 MG tablet Take 20 mg by mouth Twice a Week      potassium chloride (KLOR-CON) 20 MEQ packet Take 20 mEq by mouth Twice a Week      ONE TOUCH ULTRASOFT LANCETS MISC Test 4 times/day before meals and at bedtime and as needed for symptoms of irregular blood glucose. 250 each 5    blood glucose monitor strips One-Touch Ultra strips. Check 4 times/day before meals and at bedtime and as needed for symptoms of irregular blood glucose 250 strip 5    SYNTHROID 75 MCG tablet Take 1 tablet by mouth Daily 90 tablet 5     No current facility-administered medications for this visit. Review of Systems  Constitutional: No fever, no chills, no diaphoresis, no generalized weakness. HEENT: No blurred vision, No sore throat, no ear pain, no hair loss  Neck: denied any neck swelling, difficulty swallowing,   Cadrdiopulomary: No CP, SOB or palpitation, No orthopnea or PND. No cough or wheezing. GI: No N/V/D, no constipation, No abdominal pain, no melena or hematochezia   : Denied any dysuria, hematuria, flank pain, discharge, or incontinence. Skin: denied any rash, ulcer, Hirsute, or hyperpigmentation. MSK: denied any joint deformity, joint pain/swelling, muscle pain, or back pain. Neuro: no numbess, no tingling, no weakness,     OBJECTIVE    /76 (Site: Left Upper Arm, Position: Sitting, Cuff Size: Medium Adult)   Pulse 60   Resp 16   Ht 5' 4.5\" (1.638 m)   Wt 165 lb 9.6 oz (75.1 kg)   SpO2 96%   BMI 27.99 kg/m²    BP Readings from Last 4 Encounters:   06/20/19 128/76     Wt Readings from Last 6 Encounters:   06/20/19 165 lb 9.6 oz (75.1 kg)       Physical examination:  General: awake alert, oriented x3, no abnormal position or movements. HEENT: normocephalic non traumatic  Neck: supple, no LN enlargement, no thyromegaly, no thyroid tenderness, no JVD. Pulm: Clear equal air entry no added sounds, no wheezing or rhonchi    CVS: S1 + S2, no murmur, no heave. Dorsalis pedis pulse palpable   Abd: soft lax, no tenderness, no organomegaly, audible bowel sounds. Skin: warm, no lesions, no rash. Musculoskeletal: No back tenderness, no kyphosis/scoliosis   Neuro: CN intact, sensation normal , muscle power normal.  Psych: normal mood, and affect    Review of Laboratory Data:  I have reviewed the following:  BASIC METABOLIC PANELResulted: 6/84/2803 11:26 AM  Component Name Value   Glucose 89   Urea Nitrogen 17   Creatinine 0.79   BUN/CREATININE RATIO 22   Sodium(Na) 139   Potassium(K) 4.1   Chloride(Cl) 106   Carbon Dioxide(CO2) 29.0   ANION GAP 8.1   Calcium(Ca) 9.0   CALCULATED OSMOLALITY 289   EGFR NON  71   EGFR  83     6/18/2019: A1c 6.2, TSH 0.378, FT4 1.3, VitD 50.1     No results found for: WBC, RBC, HGB, HCT, MCV, MCH, MCHC, RDW, PLT, MPV, GRANULOCYTES, BANDS   No results found for: NA, K, CO2, BUN, CREATININE, CALCIUM, LABGLOM, GFRAA   No results found for: TSH, T4FREE, I0LHSRU, FT3, I4DOMXJ, TSI, TPOABS, THGAB  No results found for: LABA1C, GLUCOSE, MALBCR, LABMICR, LABCREA  No results found for: CHOL, TRIG, HDL  No results found for: [address] [facility] [address] Records/Labs/Images Review:   I personally reviewed and summarized previous records   All labs were reviewed independently    [address] Hyatt, a 77 y.o.-old female seen in for following issues     Primary hypothyroidism   · At goal, continue synthroid 75 mcg daily   · TFT in 6 months and before next visit in a year     MNG  · last thyroid US 11/2017, showed small stable thyroid nodule bilateral, smaller than prior US 2014  · small size and stability reassuring.  Low risk of malignancy  · Next US will be in 2-3 years     Osteoporosis  · s/p fosamax x 5 Yrs, 1 dose reclast couldn't tolerate  · Was on Boniva 150 mg monthly from 2016 until 12/2018 · DXA scan 12/2018 --> osteopenia at hip and spine   · fall precautions  · cont ca/vit D    vitD deficiency   · on 1000 daily plus ome in Mv  · level 53.9 on 10/2018, cont same dose    H/o hyperparathyroidism   · Likely secondary to vitD deficiency   · Continue vitd supplements   · Recent labs showed normal calcium and normal renal function     Hyperlipidemia  · Continue statin     Prediabetes   · A1c 6.2%   · continue lifestyle changes    Return in about 1 year (around 6/20/2020) for Hypothyroidism, Osteoprosis . The above issues were reviewed with the patient who understood and agreed with the plan. Thank you for allowing us to participate in the care of this patient. Please do not hesitate to contact us with any additional questions. Diagnosis Orders   1. Hypothyroidism, unspecified type  SYNTHROID 75 MCG tablet    T4, Free    TSH without Reflex   2. Prediabetes  ONE TOUCH ULTRASOFT LANCETS MISC    blood glucose monitor strips    Hemoglobin F0Q    Basic Metabolic Panel   3.  Vitamin D deficiency  Vitamin D 25 Hydroxy       Pavan Yao MD  Endocrinologist, [place] [address] Diabetes Care and Endocrinology   [address] [place] [address]   Phone: [phone]  Fax: [phone]  ------------------------------------  Electronically signed by Conchis Mcgee MD  on 6/20/19 at 12:44 PM

## 2025-07-25 ENCOUNTER — PATIENT MESSAGE (OUTPATIENT)
Dept: ENDOCRINOLOGY | Age: 85
End: 2025-07-25

## 2025-07-25 DIAGNOSIS — E11.49 TYPE II DIABETES MELLITUS WITH NEUROLOGICAL MANIFESTATIONS (HCC): Primary | ICD-10-CM

## 2025-07-25 DIAGNOSIS — E55.9 VITAMIN D DEFICIENCY: ICD-10-CM

## 2025-07-31 ENCOUNTER — PATIENT MESSAGE (OUTPATIENT)
Dept: ENDOCRINOLOGY | Age: 85
End: 2025-07-31